# Patient Record
Sex: FEMALE | Race: BLACK OR AFRICAN AMERICAN | NOT HISPANIC OR LATINO | URBAN - METROPOLITAN AREA
[De-identification: names, ages, dates, MRNs, and addresses within clinical notes are randomized per-mention and may not be internally consistent; named-entity substitution may affect disease eponyms.]

---

## 2015-10-07 RX ORDER — AMLODIPINE BESYLATE 2.5 MG/1
1 TABLET ORAL
Qty: 0 | Refills: 0 | COMMUNITY
Start: 2015-10-07

## 2017-01-01 ENCOUNTER — INPATIENT (INPATIENT)
Facility: HOSPITAL | Age: 82
LOS: 0 days | End: 2017-09-03
Attending: INTERNAL MEDICINE | Admitting: INTERNAL MEDICINE
Payer: MEDICARE

## 2017-01-01 VITALS
OXYGEN SATURATION: 100 % | TEMPERATURE: 98 F | DIASTOLIC BLOOD PRESSURE: 88 MMHG | SYSTOLIC BLOOD PRESSURE: 126 MMHG | HEART RATE: 124 BPM | RESPIRATION RATE: 30 BRPM

## 2017-01-01 VITALS
SYSTOLIC BLOOD PRESSURE: 138 MMHG | RESPIRATION RATE: 18 BRPM | HEART RATE: 121 BPM | DIASTOLIC BLOOD PRESSURE: 64 MMHG | TEMPERATURE: 98 F | OXYGEN SATURATION: 100 %

## 2017-01-01 DIAGNOSIS — R06.00 DYSPNEA, UNSPECIFIED: ICD-10-CM

## 2017-01-01 DIAGNOSIS — Z90.710 ACQUIRED ABSENCE OF BOTH CERVIX AND UTERUS: Chronic | ICD-10-CM

## 2017-01-01 LAB
ALBUMIN SERPL ELPH-MCNC: 2.6 G/DL — LOW (ref 3.3–5)
ALP SERPL-CCNC: 109 U/L — SIGNIFICANT CHANGE UP (ref 40–120)
ALT FLD-CCNC: 29 U/L — SIGNIFICANT CHANGE UP (ref 4–33)
APPEARANCE UR: CLEAR — SIGNIFICANT CHANGE UP
AST SERPL-CCNC: 44 U/L — HIGH (ref 4–32)
B PERT DNA SPEC QL NAA+PROBE: SIGNIFICANT CHANGE UP
BACTERIA UR CULT: SIGNIFICANT CHANGE UP
BASE EXCESS BLDA CALC-SCNC: -0.9 MMOL/L — SIGNIFICANT CHANGE UP
BASE EXCESS BLDV CALC-SCNC: -0.7 MMOL/L — SIGNIFICANT CHANGE UP
BASE EXCESS BLDV CALC-SCNC: -10.2 MMOL/L — SIGNIFICANT CHANGE UP
BASE EXCESS BLDV CALC-SCNC: -11.6 MMOL/L — SIGNIFICANT CHANGE UP
BASOPHILS # BLD AUTO: 0.02 K/UL — SIGNIFICANT CHANGE UP (ref 0–0.2)
BASOPHILS NFR BLD AUTO: 0.1 % — SIGNIFICANT CHANGE UP (ref 0–2)
BILIRUB SERPL-MCNC: 0.5 MG/DL — SIGNIFICANT CHANGE UP (ref 0.2–1.2)
BILIRUB UR-MCNC: SIGNIFICANT CHANGE UP
BLOOD GAS VENOUS - CREATININE: 0.67 MG/DL — SIGNIFICANT CHANGE UP (ref 0.5–1.3)
BLOOD GAS VENOUS - CREATININE: 0.93 MG/DL — SIGNIFICANT CHANGE UP (ref 0.5–1.3)
BLOOD GAS VENOUS - CREATININE: 0.99 MG/DL — SIGNIFICANT CHANGE UP (ref 0.5–1.3)
BLOOD UR QL VISUAL: NEGATIVE — SIGNIFICANT CHANGE UP
BUN SERPL-MCNC: 17 MG/DL — SIGNIFICANT CHANGE UP (ref 7–23)
BUN SERPL-MCNC: 18 MG/DL — SIGNIFICANT CHANGE UP (ref 7–23)
C PNEUM DNA SPEC QL NAA+PROBE: NOT DETECTED — SIGNIFICANT CHANGE UP
CALCIUM SERPL-MCNC: 8.3 MG/DL — LOW (ref 8.4–10.5)
CALCIUM SERPL-MCNC: 9.2 MG/DL — SIGNIFICANT CHANGE UP (ref 8.4–10.5)
CHLORIDE BLDA-SCNC: 104 MMOL/L — SIGNIFICANT CHANGE UP (ref 96–108)
CHLORIDE BLDV-SCNC: 104 MMOL/L — SIGNIFICANT CHANGE UP (ref 96–108)
CHLORIDE BLDV-SCNC: 105 MMOL/L — SIGNIFICANT CHANGE UP (ref 96–108)
CHLORIDE BLDV-SCNC: 107 MMOL/L — SIGNIFICANT CHANGE UP (ref 96–108)
CHLORIDE SERPL-SCNC: 101 MMOL/L — SIGNIFICANT CHANGE UP (ref 98–107)
CHLORIDE SERPL-SCNC: 102 MMOL/L — SIGNIFICANT CHANGE UP (ref 98–107)
CK MB BLD-MCNC: 1.5 — SIGNIFICANT CHANGE UP (ref 0–2.5)
CK MB BLD-MCNC: 3.45 NG/ML — SIGNIFICANT CHANGE UP (ref 1–4.7)
CK MB BLD-MCNC: 7.14 NG/ML — HIGH (ref 1–4.7)
CK SERPL-CCNC: 112 U/L — SIGNIFICANT CHANGE UP (ref 25–170)
CK SERPL-CCNC: 146 U/L — SIGNIFICANT CHANGE UP (ref 25–170)
CK SERPL-CCNC: 228 U/L — HIGH (ref 25–170)
CO2 SERPL-SCNC: 20 MMOL/L — LOW (ref 22–31)
CO2 SERPL-SCNC: 21 MMOL/L — LOW (ref 22–31)
COLOR SPEC: YELLOW — SIGNIFICANT CHANGE UP
CREAT BLDA-MCNC: 0.66 MG/DL — SIGNIFICANT CHANGE UP (ref 0.5–1.3)
CREAT SERPL-MCNC: 0.73 MG/DL — SIGNIFICANT CHANGE UP (ref 0.5–1.3)
CREAT SERPL-MCNC: 0.78 MG/DL — SIGNIFICANT CHANGE UP (ref 0.5–1.3)
EOSINOPHIL # BLD AUTO: 0.01 K/UL — SIGNIFICANT CHANGE UP (ref 0–0.5)
EOSINOPHIL NFR BLD AUTO: 0.1 % — SIGNIFICANT CHANGE UP (ref 0–6)
FLUAV H1 2009 PAND RNA SPEC QL NAA+PROBE: NOT DETECTED — SIGNIFICANT CHANGE UP
FLUAV H1 RNA SPEC QL NAA+PROBE: NOT DETECTED — SIGNIFICANT CHANGE UP
FLUAV H3 RNA SPEC QL NAA+PROBE: NOT DETECTED — SIGNIFICANT CHANGE UP
FLUAV SUBTYP SPEC NAA+PROBE: SIGNIFICANT CHANGE UP
FLUBV RNA SPEC QL NAA+PROBE: NOT DETECTED — SIGNIFICANT CHANGE UP
GAS PNL BLDV: 133 MMOL/L — LOW (ref 136–146)
GAS PNL BLDV: 134 MMOL/L — LOW (ref 136–146)
GAS PNL BLDV: 137 MMOL/L — SIGNIFICANT CHANGE UP (ref 136–146)
GLUCOSE BLDA-MCNC: 200 MG/DL — HIGH (ref 70–99)
GLUCOSE BLDV-MCNC: 167 — HIGH (ref 70–99)
GLUCOSE BLDV-MCNC: 284 — HIGH (ref 70–99)
GLUCOSE BLDV-MCNC: 298 — HIGH (ref 70–99)
GLUCOSE SERPL-MCNC: 164 MG/DL — HIGH (ref 70–99)
GLUCOSE SERPL-MCNC: 187 MG/DL — HIGH (ref 70–99)
GLUCOSE UR-MCNC: NEGATIVE — SIGNIFICANT CHANGE UP
HADV DNA SPEC QL NAA+PROBE: NOT DETECTED — SIGNIFICANT CHANGE UP
HCO3 BLDA-SCNC: 24 MMOL/L — SIGNIFICANT CHANGE UP (ref 22–26)
HCO3 BLDV-SCNC: 15 MMOL/L — LOW (ref 20–27)
HCO3 BLDV-SCNC: 15 MMOL/L — LOW (ref 20–27)
HCO3 BLDV-SCNC: 24 MMOL/L — SIGNIFICANT CHANGE UP (ref 20–27)
HCOV 229E RNA SPEC QL NAA+PROBE: NOT DETECTED — SIGNIFICANT CHANGE UP
HCOV HKU1 RNA SPEC QL NAA+PROBE: NOT DETECTED — SIGNIFICANT CHANGE UP
HCOV NL63 RNA SPEC QL NAA+PROBE: NOT DETECTED — SIGNIFICANT CHANGE UP
HCOV OC43 RNA SPEC QL NAA+PROBE: NOT DETECTED — SIGNIFICANT CHANGE UP
HCT VFR BLD CALC: 34.9 % — SIGNIFICANT CHANGE UP (ref 34.5–45)
HCT VFR BLDA CALC: 31.2 % — LOW (ref 34.5–46.5)
HCT VFR BLDV CALC: 28.5 % — LOW (ref 34.5–45)
HCT VFR BLDV CALC: 31.4 % — LOW (ref 34.5–45)
HCT VFR BLDV CALC: 34.3 % — LOW (ref 34.5–45)
HGB BLD-MCNC: 10.7 G/DL — LOW (ref 11.5–15.5)
HGB BLDA-MCNC: 10.1 G/DL — LOW (ref 11.5–15.5)
HGB BLDV-MCNC: 10.2 G/DL — LOW (ref 11.5–15.5)
HGB BLDV-MCNC: 11.1 G/DL — LOW (ref 11.5–15.5)
HGB BLDV-MCNC: 9.2 G/DL — LOW (ref 11.5–15.5)
HMPV RNA SPEC QL NAA+PROBE: NOT DETECTED — SIGNIFICANT CHANGE UP
HPIV1 RNA SPEC QL NAA+PROBE: NOT DETECTED — SIGNIFICANT CHANGE UP
HPIV2 RNA SPEC QL NAA+PROBE: NOT DETECTED — SIGNIFICANT CHANGE UP
HPIV3 RNA SPEC QL NAA+PROBE: NOT DETECTED — SIGNIFICANT CHANGE UP
HPIV4 RNA SPEC QL NAA+PROBE: NOT DETECTED — SIGNIFICANT CHANGE UP
HYALINE CASTS # UR AUTO: SIGNIFICANT CHANGE UP (ref 0–?)
IMM GRANULOCYTES # BLD AUTO: 0.08 # — SIGNIFICANT CHANGE UP
IMM GRANULOCYTES NFR BLD AUTO: 0.5 % — SIGNIFICANT CHANGE UP (ref 0–1.5)
KETONES UR-MCNC: NEGATIVE — SIGNIFICANT CHANGE UP
L PNEUMO AG UR QL: NEGATIVE — SIGNIFICANT CHANGE UP
LACTATE BLDA-SCNC: 2.3 MMOL/L — HIGH (ref 0.5–2)
LACTATE BLDV-MCNC: 11 MMOL/L — CRITICAL HIGH (ref 0.5–2)
LACTATE BLDV-MCNC: 13.4 MMOL/L — CRITICAL HIGH (ref 0.5–2)
LACTATE BLDV-MCNC: 4.7 MMOL/L — CRITICAL HIGH (ref 0.5–2)
LEUKOCYTE ESTERASE UR-ACNC: NEGATIVE — SIGNIFICANT CHANGE UP
LYMPHOCYTES # BLD AUTO: 1.78 K/UL — SIGNIFICANT CHANGE UP (ref 1–3.3)
LYMPHOCYTES # BLD AUTO: 12.2 % — LOW (ref 13–44)
M PNEUMO DNA SPEC QL NAA+PROBE: NOT DETECTED — SIGNIFICANT CHANGE UP
MAGNESIUM SERPL-MCNC: 1.9 MG/DL — SIGNIFICANT CHANGE UP (ref 1.6–2.6)
MCHC RBC-ENTMCNC: 25.7 PG — LOW (ref 27–34)
MCHC RBC-ENTMCNC: 30.7 % — LOW (ref 32–36)
MCV RBC AUTO: 83.9 FL — SIGNIFICANT CHANGE UP (ref 80–100)
MONOCYTES # BLD AUTO: 0.82 K/UL — SIGNIFICANT CHANGE UP (ref 0–0.9)
MONOCYTES NFR BLD AUTO: 5.6 % — SIGNIFICANT CHANGE UP (ref 2–14)
MUCOUS THREADS # UR AUTO: SIGNIFICANT CHANGE UP
NEUTROPHILS # BLD AUTO: 11.87 K/UL — HIGH (ref 1.8–7.4)
NEUTROPHILS NFR BLD AUTO: 81.5 % — HIGH (ref 43–77)
NITRITE UR-MCNC: NEGATIVE — SIGNIFICANT CHANGE UP
NRBC # FLD: 0.02 — SIGNIFICANT CHANGE UP
NT-PROBNP SERPL-SCNC: 6876 PG/ML — SIGNIFICANT CHANGE UP
PCO2 BLDA: 28 MMHG — LOW (ref 32–48)
PCO2 BLDV: 36 MMHG — LOW (ref 41–51)
PCO2 BLDV: 38 MMHG — LOW (ref 41–51)
PCO2 BLDV: 47 MMHG — SIGNIFICANT CHANGE UP (ref 41–51)
PH BLDA: 7.51 PH — HIGH (ref 7.35–7.45)
PH BLDV: 7.18 PH — CRITICAL LOW (ref 7.32–7.43)
PH BLDV: 7.21 PH — LOW (ref 7.32–7.43)
PH BLDV: 7.43 PH — SIGNIFICANT CHANGE UP (ref 7.32–7.43)
PH UR: 6 — SIGNIFICANT CHANGE UP (ref 4.6–8)
PHOSPHATE SERPL-MCNC: 2.7 MG/DL — SIGNIFICANT CHANGE UP (ref 2.5–4.5)
PLATELET # BLD AUTO: 254 K/UL — SIGNIFICANT CHANGE UP (ref 150–400)
PMV BLD: 11.1 FL — SIGNIFICANT CHANGE UP (ref 7–13)
PO2 BLDA: 123 MMHG — HIGH (ref 83–108)
PO2 BLDV: 39 MMHG — SIGNIFICANT CHANGE UP (ref 35–40)
PO2 BLDV: 45 MMHG — HIGH (ref 35–40)
PO2 BLDV: 49 MMHG — HIGH (ref 35–40)
POTASSIUM BLDA-SCNC: 3.2 MMOL/L — LOW (ref 3.4–4.5)
POTASSIUM BLDV-SCNC: 3.7 MMOL/L — SIGNIFICANT CHANGE UP (ref 3.4–4.5)
POTASSIUM BLDV-SCNC: 3.9 MMOL/L — SIGNIFICANT CHANGE UP (ref 3.4–4.5)
POTASSIUM BLDV-SCNC: 4 MMOL/L — SIGNIFICANT CHANGE UP (ref 3.4–4.5)
POTASSIUM SERPL-MCNC: 3.6 MMOL/L — SIGNIFICANT CHANGE UP (ref 3.5–5.3)
POTASSIUM SERPL-MCNC: 4.9 MMOL/L — SIGNIFICANT CHANGE UP (ref 3.5–5.3)
POTASSIUM SERPL-SCNC: 3.6 MMOL/L — SIGNIFICANT CHANGE UP (ref 3.5–5.3)
POTASSIUM SERPL-SCNC: 4.9 MMOL/L — SIGNIFICANT CHANGE UP (ref 3.5–5.3)
PROT SERPL-MCNC: 7 G/DL — SIGNIFICANT CHANGE UP (ref 6–8.3)
PROT UR-MCNC: 30 — HIGH
RBC # BLD: 4.16 M/UL — SIGNIFICANT CHANGE UP (ref 3.8–5.2)
RBC # FLD: 19.6 % — HIGH (ref 10.3–14.5)
RBC CASTS # UR COMP ASSIST: HIGH (ref 0–?)
RSV RNA SPEC QL NAA+PROBE: NOT DETECTED — SIGNIFICANT CHANGE UP
RV+EV RNA SPEC QL NAA+PROBE: NOT DETECTED — SIGNIFICANT CHANGE UP
SAO2 % BLDA: 99.1 % — HIGH (ref 95–99)
SAO2 % BLDV: 60.8 % — SIGNIFICANT CHANGE UP (ref 60–85)
SAO2 % BLDV: 61.5 % — SIGNIFICANT CHANGE UP (ref 60–85)
SAO2 % BLDV: 66.7 % — SIGNIFICANT CHANGE UP (ref 60–85)
SODIUM BLDA-SCNC: 136 MMOL/L — SIGNIFICANT CHANGE UP (ref 136–146)
SODIUM SERPL-SCNC: 138 MMOL/L — SIGNIFICANT CHANGE UP (ref 135–145)
SODIUM SERPL-SCNC: 139 MMOL/L — SIGNIFICANT CHANGE UP (ref 135–145)
SP GR SPEC: 1.02 — SIGNIFICANT CHANGE UP (ref 1–1.03)
SPECIMEN SOURCE: SIGNIFICANT CHANGE UP
SQUAMOUS # UR AUTO: SIGNIFICANT CHANGE UP
TROPONIN T SERPL-MCNC: 0.67 NG/ML — HIGH (ref 0–0.06)
UROBILINOGEN FLD QL: >8 E.U. — SIGNIFICANT CHANGE UP (ref 0.1–0.2)
WBC # BLD: 14.58 K/UL — HIGH (ref 3.8–10.5)
WBC # FLD AUTO: 14.58 K/UL — HIGH (ref 3.8–10.5)
WBC UR QL: HIGH (ref 0–?)

## 2017-01-01 PROCEDURE — 71010: CPT | Mod: 26

## 2017-01-01 PROCEDURE — 74177 CT ABD & PELVIS W/CONTRAST: CPT | Mod: 26

## 2017-01-01 RX ORDER — FENTANYL CITRATE 50 UG/ML
25 INJECTION INTRAVENOUS ONCE
Qty: 0 | Refills: 0 | Status: DISCONTINUED | OUTPATIENT
Start: 2017-01-01 | End: 2017-01-01

## 2017-01-01 RX ORDER — PIPERACILLIN AND TAZOBACTAM 4; .5 G/20ML; G/20ML
3.38 INJECTION, POWDER, LYOPHILIZED, FOR SOLUTION INTRAVENOUS ONCE
Qty: 0 | Refills: 0 | Status: COMPLETED | OUTPATIENT
Start: 2017-01-01 | End: 2017-01-01

## 2017-01-01 RX ORDER — VANCOMYCIN HCL 1 G
1000 VIAL (EA) INTRAVENOUS ONCE
Qty: 0 | Refills: 0 | Status: COMPLETED | OUTPATIENT
Start: 2017-01-01 | End: 2017-01-01

## 2017-01-01 RX ORDER — ACETAMINOPHEN 500 MG
1000 TABLET ORAL ONCE
Qty: 0 | Refills: 0 | Status: COMPLETED | OUTPATIENT
Start: 2017-01-01 | End: 2017-01-01

## 2017-01-01 RX ORDER — PIPERACILLIN AND TAZOBACTAM 4; .5 G/20ML; G/20ML
3.38 INJECTION, POWDER, LYOPHILIZED, FOR SOLUTION INTRAVENOUS EVERY 8 HOURS
Qty: 0 | Refills: 0 | Status: DISCONTINUED | OUTPATIENT
Start: 2017-01-01 | End: 2017-01-01

## 2017-01-01 RX ORDER — IPRATROPIUM/ALBUTEROL SULFATE 18-103MCG
3 AEROSOL WITH ADAPTER (GRAM) INHALATION
Qty: 0 | Refills: 0 | Status: COMPLETED | OUTPATIENT
Start: 2017-01-01 | End: 2017-01-01

## 2017-01-01 RX ORDER — VANCOMYCIN HCL 1 G
1000 VIAL (EA) INTRAVENOUS EVERY 12 HOURS
Qty: 0 | Refills: 0 | Status: DISCONTINUED | OUTPATIENT
Start: 2017-01-01 | End: 2017-01-01

## 2017-01-01 RX ORDER — FUROSEMIDE 40 MG
40 TABLET ORAL EVERY 12 HOURS
Qty: 0 | Refills: 0 | Status: DISCONTINUED | OUTPATIENT
Start: 2017-01-01 | End: 2017-01-01

## 2017-01-01 RX ORDER — ACETAMINOPHEN 500 MG
650 TABLET ORAL ONCE
Qty: 0 | Refills: 0 | Status: DISCONTINUED | OUTPATIENT
Start: 2017-01-01 | End: 2017-01-01

## 2017-01-01 RX ORDER — SODIUM CHLORIDE 9 MG/ML
1000 INJECTION INTRAMUSCULAR; INTRAVENOUS; SUBCUTANEOUS ONCE
Qty: 0 | Refills: 0 | Status: COMPLETED | OUTPATIENT
Start: 2017-01-01 | End: 2017-01-01

## 2017-01-01 RX ORDER — IPRATROPIUM/ALBUTEROL SULFATE 18-103MCG
3 AEROSOL WITH ADAPTER (GRAM) INHALATION EVERY 6 HOURS
Qty: 0 | Refills: 0 | Status: DISCONTINUED | OUTPATIENT
Start: 2017-01-01 | End: 2017-01-01

## 2017-01-01 RX ORDER — FOLIC ACID 0.8 MG
1 TABLET ORAL
Qty: 0 | Refills: 0 | COMMUNITY

## 2017-01-01 RX ORDER — FUROSEMIDE 40 MG
40 TABLET ORAL ONCE
Qty: 0 | Refills: 0 | Status: COMPLETED | OUTPATIENT
Start: 2017-01-01 | End: 2017-01-01

## 2017-01-01 RX ORDER — ENOXAPARIN SODIUM 100 MG/ML
70 INJECTION SUBCUTANEOUS ONCE
Qty: 0 | Refills: 0 | Status: COMPLETED | OUTPATIENT
Start: 2017-01-01 | End: 2017-01-01

## 2017-01-01 RX ORDER — AZITHROMYCIN 500 MG/1
500 TABLET, FILM COATED ORAL ONCE
Qty: 0 | Refills: 0 | Status: COMPLETED | OUTPATIENT
Start: 2017-01-01 | End: 2017-01-01

## 2017-01-01 RX ORDER — ENOXAPARIN SODIUM 100 MG/ML
70 INJECTION SUBCUTANEOUS
Qty: 0 | Refills: 0 | Status: DISCONTINUED | OUTPATIENT
Start: 2017-01-01 | End: 2017-01-01

## 2017-01-01 RX ORDER — AZITHROMYCIN 500 MG/1
500 TABLET, FILM COATED ORAL EVERY 24 HOURS
Qty: 0 | Refills: 0 | Status: DISCONTINUED | OUTPATIENT
Start: 2017-01-01 | End: 2017-01-01

## 2017-01-01 RX ORDER — IPRATROPIUM/ALBUTEROL SULFATE 18-103MCG
3 AEROSOL WITH ADAPTER (GRAM) INHALATION EVERY 4 HOURS
Qty: 0 | Refills: 0 | Status: DISCONTINUED | OUTPATIENT
Start: 2017-01-01 | End: 2017-01-01

## 2017-01-01 RX ORDER — METHOTREXATE 2.5 MG/1
1 TABLET ORAL
Qty: 0 | Refills: 0 | COMMUNITY

## 2017-01-01 RX ADMIN — PIPERACILLIN AND TAZOBACTAM 25 GRAM(S): 4; .5 INJECTION, POWDER, LYOPHILIZED, FOR SOLUTION INTRAVENOUS at 06:02

## 2017-01-01 RX ADMIN — PIPERACILLIN AND TAZOBACTAM 200 GRAM(S): 4; .5 INJECTION, POWDER, LYOPHILIZED, FOR SOLUTION INTRAVENOUS at 04:58

## 2017-01-01 RX ADMIN — Medication 40 MILLIGRAM(S): at 18:36

## 2017-01-01 RX ADMIN — PIPERACILLIN AND TAZOBACTAM 25 GRAM(S): 4; .5 INJECTION, POWDER, LYOPHILIZED, FOR SOLUTION INTRAVENOUS at 21:53

## 2017-01-01 RX ADMIN — Medication 3 MILLILITER(S): at 04:51

## 2017-01-01 RX ADMIN — Medication 40 MILLIGRAM(S): at 06:02

## 2017-01-01 RX ADMIN — Medication 125 MILLIGRAM(S): at 21:53

## 2017-01-01 RX ADMIN — Medication 125 MILLIGRAM(S): at 04:52

## 2017-01-01 RX ADMIN — AZITHROMYCIN 250 MILLIGRAM(S): 500 TABLET, FILM COATED ORAL at 10:34

## 2017-01-01 RX ADMIN — PIPERACILLIN AND TAZOBACTAM 25 GRAM(S): 4; .5 INJECTION, POWDER, LYOPHILIZED, FOR SOLUTION INTRAVENOUS at 14:14

## 2017-01-01 RX ADMIN — ENOXAPARIN SODIUM 70 MILLIGRAM(S): 100 INJECTION SUBCUTANEOUS at 08:41

## 2017-01-01 RX ADMIN — Medication 800 MILLIGRAM(S): at 04:50

## 2017-01-01 RX ADMIN — Medication 125 MILLIGRAM(S): at 06:02

## 2017-01-01 RX ADMIN — Medication 250 MILLIGRAM(S): at 05:56

## 2017-01-01 RX ADMIN — ENOXAPARIN SODIUM 70 MILLIGRAM(S): 100 INJECTION SUBCUTANEOUS at 21:52

## 2017-01-01 RX ADMIN — Medication 40 MILLIGRAM(S): at 06:29

## 2017-01-01 RX ADMIN — Medication 3 MILLILITER(S): at 04:41

## 2017-01-01 RX ADMIN — Medication 250 MILLIGRAM(S): at 06:02

## 2017-01-01 RX ADMIN — FENTANYL CITRATE 25 MICROGRAM(S): 50 INJECTION INTRAVENOUS at 05:59

## 2017-01-01 RX ADMIN — Medication 3 MILLILITER(S): at 04:55

## 2017-01-01 RX ADMIN — Medication 125 MILLIGRAM(S): at 14:13

## 2017-01-01 RX ADMIN — FENTANYL CITRATE 25 MICROGRAM(S): 50 INJECTION INTRAVENOUS at 06:15

## 2017-01-01 RX ADMIN — SODIUM CHLORIDE 500 MILLILITER(S): 9 INJECTION INTRAMUSCULAR; INTRAVENOUS; SUBCUTANEOUS at 04:50

## 2017-01-01 RX ADMIN — Medication 250 MILLIGRAM(S): at 18:36

## 2017-01-01 RX ADMIN — Medication 3 MILLILITER(S): at 04:50

## 2017-09-02 NOTE — ED PROVIDER NOTE - CONSTITUTIONAL MENTATION
awake/alert/oriented to self and place, following basic commands but intermittently moaning and tachypneic

## 2017-09-02 NOTE — CHART NOTE - NSCHARTNOTEFT_GEN_A_CORE
MAR Accept Note (MICU to Medicine Service); hx obtained primarily from chart, as patient is poor historian.    87F w/ HTN, HLD, and RA on steroids BIBEMS from nursing home ([facility]) with respiratory distress. Pt on BiPAP at time of initial encounter by admitting team, minimally responsive and unable to provide any history. Pt received nebulizer while in route with minimal improvement in symptoms. Was noted to have worsening respiratory distress while in the ED and placed on bilevel support. CXR revealed pulm edema vs multifocal PNA. Labs revealed worsening lactic acidosis to 13.4. CT Abd/pelvis was done as pt c/o abd pain with lactic acidosis which revealed filling defect in RLL pulm artery. Pt DNR/DNI per documentation from [facility], confirmed with HCP (Katherine Edmondson). CTA thorax was not ordered due to recent dye load with CT abd/pelvis.    Patient was given lasix 40 for pulm edema on CXR and started on therapeutic lovenox 1mg/kg BID. TTE has been ordered to evaluate for right heart strain as CE were elevated (pending). Patient was started on stress dose steroids due to chronic home use for RA. ABG on bipap showed pH 7.51 with CO2 28 and lactate of 2.3. Patient was taken off bipap to NC 2L and maintained saturation above 90. Patient hemodynamically stable at time of transfer.     ICU Vital Signs Last 24 Hrs  T(C): 36.6 (02 Sep 2017 16:00), Max: 38.2 (02 Sep 2017 04:30)  T(F): 97.9 (02 Sep 2017 16:00), Max: 100.7 (02 Sep 2017 04:30)  HR: 112 (02 Sep 2017 18:35) (78 - 127)  BP: 97/75 (02 Sep 2017 18:00) (84/66 - 126/88)  BP(mean): 80 (02 Sep 2017 18:00) (68 - 80)  RR: 31 (02 Sep 2017 18:00) (12 - 40)  SpO2: 100% (02 Sep 2017 18:35) (95% - 100%)    MEDICATIONS  (STANDING):  azithromycin  IVPB 500 milliGRAM(s) IV Intermittent every 24 hours  piperacillin/tazobactam IVPB. 3.375 Gram(s) IV Intermittent every 8 hours  vancomycin  IVPB 1000 milliGRAM(s) IV Intermittent every 12 hours  methylPREDNISolone sodium succinate Injectable 125 milliGRAM(s) IV Push every 8 hours  furosemide   Injectable 40 milliGRAM(s) IV Push every 12 hours  enoxaparin Injectable 70 milliGRAM(s) SubCutaneous two times a day    CT Abd and Pelvis:  Bibasilar  airspace  opacities  and  small  pleural  effusions.  Findings  could represent  pulmonary  edema,  although  pneumonia  is  in  the  differential  diagnosis. Questioned  filling  defect  in  the  right  lower  lobe  pulmonary  artery  on  the first  image;  CTA  could  be  obtained  to  evaluate  for  pulmonary  embolism.    A&P:   86 y/o F w/ HTN, HLD, and RA on steroids p/w respiratory distress, suspect multifactorial in the setting of pulm edema, multifocal pna, and PE.    #Neuro - Off sedation, poor mental status    #Resp - c/w diuresis with lasix and anticoagulate with lovenox 1mg/kg BID.   - f.u TTE  - currently holding off on CTA due to dye load from CT A/P   - saturating well on 2L NC    #Cardio - Concern for obstructive shock from PE.   - Maintaining pressure, although pt on stress dose steroids.   - c/w lovenox for a/c.   - TTE pending.     #GI - NPO due to respiratory status    #Renal - Cr stable, monitor for SEAN after dye load    #Heme - H/H stable, c/w lovenox for a/c.     #ID - leukocytosis, possible pneumonia on CXR. Blood and urine cultures pending. urine legionella pending. c/w empiric broad spectrum abx.    #Endo - c/w stress dose steroids     #DVT - Therapeutic lovenox

## 2017-09-02 NOTE — ED PROVIDER NOTE - PROGRESS NOTE DETAILS
Pt now c/o abdominal pain, unable to localize.  Abd is soft on exam but diffusely tender without rebound or guarding.  Will pain control, obtain CT abd/pel to eval for intraabd source of infection.  Trop also positive here, but in setting of abd pain/fever and unclear etiology, will hold heparin pending CT results. After nebs, pt with diffuse rales on exam, again tachypneic, sat 96.  Bedside us with bilat b-lines and bilat pleural effusions, with concern for fluid overload pt's IVF's held.  CXR concerning for multifocal pna.  Given resp sxs, pt started on bipap.  Repeat blood gas with worsening lactic acidosis.  MICU consulted and will come evaluate the patient. Pt now c/o abdominal pain, unable to localize.  Abd is soft on exam but diffusely tender without rebound or guarding.  Will pain control, obtain CT abd/pel to eval for intraabd source of infection.  Trop also positive here, likely secondary to demand, and in setting of abd pain/fever and unclear etiology, will hold heparin pending CT results. Dr. Best: Pt was signed out to me. Pt sating 98% on Bipap. Noted to have possible pulmonary artery filling defect on CT of abd/pelvis. Lovenox ordered. Pt states feeling better. Repeat VBG shows increase in [place]. Awaiting ICU consult. Dr. Best: Pt was seen by ICU who will take the pt.

## 2017-09-02 NOTE — ED PROVIDER NOTE - OBJECTIVE STATEMENT
86 y/o F with h/o HTN, hyperlipidemia, RA on steroids, COPD, 88 y/o F with h/o HTN, hyperlipidemia, RA on steroids, COPD, DNR/DNI BIB EMS from NH for respiratory distress.  Pt noted to have difficulty breathing earlier in the evening.  Per EMS, pt received nebulizer prior to their arrival with no improvement in symptoms.  Pt is awake on arrival, A&Ox2 (person and place), but only responding to yes/no questions.  She is otherwise unable to provide any other history.

## 2017-09-02 NOTE — H&P ADULT - NSHPPHYSICALEXAM_GEN_ALL_CORE
GENERAL APPEARANCE: On Bilevel, somnolent but arousable  HEENT:  NC/AT, clear conjunctiva  NECK: Neck supple, non-tender without lymphadenopathy, masses  CARDIAC: Normal S1 and S2. tachy  LUNGS: b/l crackles, on bilevel  ABDOMEN: Soft, nondistended, nontender. No guarding or rebound.   MUSKULOSKELETAL: No joint erythema or tenderness.   EXTREMITIES: No edema.  NEUROLOGICAL: Follows some commands, no focal deficits  SKIN: Skin clean, dry, intact  PSYCHIATRIC: somnolent, but arousable

## 2017-09-02 NOTE — ED ADULT NURSE REASSESSMENT NOTE - NS ED NURSE REASSESS COMMENT FT1
Pt is arousable to verbal stimuli. BP is low. Dr. Best aware. Awaiting MICU. BIPAP/CPAP setting changed to 12/5/40%

## 2017-09-02 NOTE — H&P ADULT - HISTORY OF PRESENT ILLNESS
88 y/o F w/ HTN, HLD, and RA on steroids BIBEMS from nursing home with respiratory distress. Pt on BiPAP at time of encounter, minimally responsive and unable to provide any history. Pt received nebulizer while in route with minimal improvement in symptoms. Was noted to have worsening respiratory distress while in the ED and placed on bilevel support. CXR revealed pulm edema vs multifocal PNA. Labs revealed worsening lactic acidosis. CT Abd/pelvis was done as pt c/o abd pain which revealed filling defect in RLL pulm artery. Pt DNR/DNI per documentation from [facility], confirmed with HCP (Katherine Edmondson).

## 2017-09-02 NOTE — H&P ADULT - ASSESSMENT
88 y/o F w/ HTN, HLD, and RA on steroids p/w respiratory distress likely multifactorial in the setting of pulm edema, multifocal pna, and PE.     #Neuro - Off sedation, poor mental status    #Resp - c/w BiPAP, pt DNR/DNI. Will diurese with lasix 40mg IV BID and anticoagulate with lovenox 1mg/kg BID. Will obtain formal TTE to assess RV function. CT A/P revealed questionable filling defect, will hold off on CTA for now as pt just got dye load from CT A/P.     #Cardio - Concern for obstructive shock from PE. Maintaining pressure, although pt on stress dose steroids. c/w lovenox for a/c. TTE pending.     #GI - NPO for now while on BiPAP    #Renal - Cr stable, monitor for SEAN after dye load    #Heme - H/H stable, c/w lovenox for a/c.     #ID - Blood cultures pending. c/w empiric broad spectrum abx.    #Endo - c/w stress dose steroids     #DVT - Therapeutic lovenox

## 2017-09-02 NOTE — H&P ADULT - FAMILY HISTORY
<<-----Click on this checkbox to enter Family History Family history of cerebrovascular accident (CVA)     Sibling  Still living? Unknown  Family history of hypertension, Age at diagnosis: Age Unknown

## 2017-09-02 NOTE — ED ADULT TRIAGE NOTE - CHIEF COMPLAINT QUOTE
Pt sent from [facility]. Sent for eval of Resp distress started yesterday am...given Neb tx with no improvement...pt is DNR/DNI. Pt arrives on 100%. Pt opening eyes to name...answers yes/no to simple questions.

## 2017-09-02 NOTE — ED ADULT NURSE REASSESSMENT NOTE - NS ED NURSE REASSESS COMMENT FT1
pt. asleep but arousable and responds to voice. pt. hypotensive, MD Morrow made aware. awaits CTr. will continue to monitor

## 2017-09-02 NOTE — ED PROVIDER NOTE - CRITICAL CARE PROVIDED
direct patient care (not related to procedure)/additional history taking/documentation/consultation with other physicians

## 2017-09-02 NOTE — ED ADULT NURSE REASSESSMENT NOTE - NS ED NURSE REASSESS COMMENT FT1
Ashleigh Silva is a 62 year old female presenting for   Chief Complaint   Patient presents with   • Establish Care     Denies Latex allergy or sensitivity.    Currently is not taking any medications  Refills needed today: No    Health Maintenance Summary     Topic Due On Due Status Completed On    MAMMOGRAM - BREAST CANCER SCREENING Dec 16, 1994 Overdue     Colorectal Cancer Screening - Colonoscopy Dec 16, 2004 Overdue     Pap Smear - Cervical Cancer Screening  Dec 16, 1984 Overdue     Immunization - Td/Tdap Dec 16, 1972 Overdue     Immunization-Zoster Dec 16, 2014 Overdue     Immunization - TDAP Pregnancy  Hidden     Immunization-Influenza Sep 1, 2016 Overdue             Patient is due for topics as listed above, she wishes to discuss with provider.       pt.'s condition the same, pt. hooked on BiPAP. pt. had 300ml of NS infused, IVF discontinued as per MD Morrow. Vancomycin infusing at this time. awaits further eval. will continue to monitor

## 2017-09-02 NOTE — ED PROVIDER NOTE - MEDICAL DECISION MAKING DETAILS
86 y/o from NH resp distress.  Decreased BS on exam, febrile here.  Plan ekg, nebs, steroids, broad spectrum abx, labs cxr, admit.  copd exacerbation vs pna/sepsis.

## 2017-09-02 NOTE — ED ADULT NURSE REASSESSMENT NOTE - NS ED NURSE REASSESS COMMENT FT1
pt. noted tachycardic, tachypneic, screaming, c/o pain on stomach. appears in distress. MD Morrow went to see pt. neb tx in progress. pain med was given as ordered. antibiotics in progress as ordered. Fritz from lab called and reported Troponin 0.93. MD Morrow made aware. will continue to monitor

## 2017-09-02 NOTE — H&P ADULT - NSHPLABSRESULTS_GEN_ALL_CORE
10.7   14.58 )-----------( 254      ( 02 Sep 2017 04:10 )             34.9     09-02    138  |  101  |  17  ----------------------------<  164<H>  4.9   |  21<L>  |  0.78    Ca    9.2      02 Sep 2017 04:10    TPro  7.0  /  Alb  2.6<L>  /  TBili  0.5  /  DBili  x   /  AST  44<H>  /  ALT  29  /  AlkPhos  109  09-02    CARDIAC MARKERS ( 02 Sep 2017 04:10 )  x     / 0.93 ng/mL / 228 u/L / 3.45 ng/mL / x            LIVER FUNCTIONS - ( 02 Sep 2017 04:10 )  Alb: 2.6 g/dL / Pro: 7.0 g/dL / ALK PHOS: 109 u/L / ALT: 29 u/L / AST: 44 u/L / GGT: x           Urinalysis Basic - ( 02 Sep 2017 05:02 )    Color: YELLOW / Appearance: CLEAR / S.024 / pH: 6.0  Gluc: NEGATIVE / Ketone: NEGATIVE  / Bili: SMALL / Urobili: >8 E.U.   Blood: NEGATIVE / Protein: 30 / Nitrite: NEGATIVE   Leuk Esterase: NEGATIVE / RBC: 5-10 / WBC 5-10   Sq Epi: OCC / Non Sq Epi: x / Bacteria: x

## 2017-09-02 NOTE — ED PROVIDER NOTE - SKIN, MLM
Skin normal color for race, warm, dry and intact. No evidence of rash.  No pedal edema, no calf swelling.

## 2017-09-03 NOTE — CHART NOTE - NSCHARTNOTEFT_GEN_A_CORE
Informed by RN. Patient found unresponsive  to noxious stimuli. Patient seen and evaluated . Pupils fixed and dilated. No spontanous breathing . No palpable carotid and radial pulses. No heart sounds auscultated. No breath sounds auscultated. Time of death 9:30 Am. Dr Ryan made aware. Family members called. Informed by RN. Patient found unresponsive  to noxious stimuli. Patient seen and evaluated . Pupils fixed and dilated. No spontanous breathing . No palpable carotid and radial pulses. No heart sounds auscultated. No breath sounds auscultated. Time of death 9:30 Am. made aware. Family members called. Informed by RN. Patient found unresponsive  to noxious stimuli. Patient seen and evaluated . Pupils fixed and dilated. No spontaneous breathing. No palpable carotid and radial pulses. No heart sounds auscultated. No breath sounds auscultated. Time of death 9:30 Am. Dr. Cuenca made aware. Attempting to contact family members.

## 2017-09-03 NOTE — CHART NOTE - NSCHARTNOTEFT_GEN_A_CORE
MAR Chart note    Informed of patient passing away this morning. Called HCP to inform family; spoke with niece/HCP, Katherine Carreraleanne; reported that they would come see her later in the day. Patient to be taken down to the morgue for viewing by family. Declined autopsy. Discussed with primary team NP.

## 2017-09-03 NOTE — DISCHARGE NOTE FOR THE EXPIRED PATIENT - HOSPITAL COURSE
87F w/ HTN, HLD, and RA on steroids BIBEMS from nursing home with respiratory distress likely multifactorial in setting of pulm edema, multifocal PNA and PE. Pt placed on BiPAP in ED  minimally responsive and unable to provide any history.  Labs revealed worsening lactic acidosis to 13.4. CT Abd/pelvis was done as pt c/o abd pain with lactic acidosis which revealed filling defect in RLL pulm artery. Pt DNR/DNI per documentation from [facility], confirmed with HCP (Katherine Edmondson). CTA thorax was not ordered due to recent dye load with CT abd/pelvis.  Patient received lasix 40 for pulm edema on CXR and started on therapeutic lovenox 1mg/kg BID. TTE has been ordered to evaluate for right heart strain as CE were elevated. Patient was started on stress dose steroids due to chronic home use for RA. ABG on bipap showed pH 7.51 with CO2 28 and lactate of 2.3. Patient was taken off bipap to NC 2L and maintained saturation above 90. Patient hemodynamically stable and was transferred to medicine overnight.  On 9/3/17 at 9:28 Informed by RN. Patient found unresponsive  to noxious stimuli. Patient seen and evaluated . Pupils fixed and dilated. No spontaneous breathing. No palpable carotid and radial pulses. No heart sounds auscultated. No breath sounds auscultated. Time of expiration called 9:30 Am. HCP made aware of expiration by MARVivek

## 2017-09-16 LAB
BACTERIA BLD CULT: SIGNIFICANT CHANGE UP
BACTERIA BLD CULT: SIGNIFICANT CHANGE UP

## 2018-07-16 NOTE — ED ADULT NURSE REASSESSMENT NOTE - TEMPLATE LIST FOR HEAD TO TOE ASSESSMENT
Respiratory
Adequate: hears normal conversation without difficulty
